# Patient Record
Sex: FEMALE | Race: ASIAN | NOT HISPANIC OR LATINO
[De-identification: names, ages, dates, MRNs, and addresses within clinical notes are randomized per-mention and may not be internally consistent; named-entity substitution may affect disease eponyms.]

---

## 2020-02-09 ENCOUNTER — TRANSCRIPTION ENCOUNTER (OUTPATIENT)
Age: 75
End: 2020-02-09

## 2022-12-05 PROBLEM — Z00.00 ENCOUNTER FOR PREVENTIVE HEALTH EXAMINATION: Status: ACTIVE | Noted: 2022-12-05

## 2022-12-16 ENCOUNTER — APPOINTMENT (OUTPATIENT)
Dept: ORTHOPEDIC SURGERY | Facility: CLINIC | Age: 77
End: 2022-12-16

## 2022-12-16 VITALS
HEIGHT: 59 IN | HEART RATE: 61 BPM | BODY MASS INDEX: 19.96 KG/M2 | WEIGHT: 99 LBS | SYSTOLIC BLOOD PRESSURE: 142 MMHG | DIASTOLIC BLOOD PRESSURE: 80 MMHG

## 2022-12-16 VITALS — TEMPERATURE: 98.1 F

## 2022-12-16 PROCEDURE — 99203 OFFICE O/P NEW LOW 30 MIN: CPT

## 2022-12-16 PROCEDURE — 72100 X-RAY EXAM L-S SPINE 2/3 VWS: CPT

## 2022-12-16 NOTE — PHYSICAL EXAM
[Stooped] : stooped [de-identified] : Examination of the lumbar spine reveals no midline tenderness palpation, step-offs, or skin lesions. Decreased range of motion with respect to flexion, extension, lateral bending, and rotation. No tenderness to palpation of the sciatic notch. No tenderness palpation of the bilateral greater trochanters. No pain with passive internal/external rotation of the hips. No instability of bilateral lower extremities.  Negative OLI. Negative straight leg raise bilaterally. No bowstring. Negative femoral stretch. 5 out of 5 iliopsoas, hip abductors, hips adductors, quadriceps, hamstrings, gastrocsoleus, tibialis anterior, extensor hallucis longus, peroneals. Grossly intact sensation to light touch bilateral lower extremities. 1+ patellar and Achilles reflexes. Downgoing Babinski. No clonus. Intact proprioception. Palpable pulses. No skin lesion and no edema on the right and left lower extremities. [de-identified] : AP lateral lumbar x-rays reveals L3-4 spondylolisthesis\par \par Review of her lumbar spine MRI from [facility] reveals severe stenosis at L3-4 with spondylolisthesis.

## 2022-12-16 NOTE — HISTORY OF PRESENT ILLNESS
[de-identified] : Ms. SARAN CAMARA  is a 77 year old female who presents with back and bilateral leg pain.  She has a history of stenosis.   Denies any LE radicular symptoms.  Normal bowel and bladder control.   Denies any recent fevers, chills, sweats, weight loss, or infection.  She has done PT without any relief.  Walking is very limited.  SItting helps relieve her symptoms.  She has a history of diabetes. \par \par The patients past medical history, past surgical history, medications, allergies, and social history were reviewed by me today with the patient and documented accordingly.  In addition, the patient's family history, which is noncontributory to their visit, was also reviewed.\par

## 2022-12-16 NOTE — DISCUSSION/SUMMARY
[de-identified] : We discussed further treatment options.  She has tried medications and therapy without relief.  She has significant neurogenic claudication and left greater than right radiculopathy.  She can only walk for 5 minutes before needing to sit down.  Overall she feels she is deteriorating.  We discussed the nature purpose of an L3-4 decompression with instrumented fusion.  Risks of surgery were discussed including but not limited to bleeding, infection,  hardware related complications, graft migration, pseudoarthrosis, damage to nerves and vessels, continued or worsening pain and weakness, dural injury, CSF leak, need for further procedures, PE/DVT, GI, , pulmonary, and cardiac complications,  anesthetic risks, and death. The patient understands the risks and benefits and alternatives. They would like to proceed with surgery. They will be scheduled accordingly. They will follow up for a preoperative appointment.

## 2022-12-19 ENCOUNTER — OUTPATIENT (OUTPATIENT)
Dept: OUTPATIENT SERVICES | Facility: HOSPITAL | Age: 77
LOS: 1 days | End: 2022-12-19

## 2022-12-19 VITALS
RESPIRATION RATE: 16 BRPM | HEIGHT: 58 IN | OXYGEN SATURATION: 98 % | WEIGHT: 100.97 LBS | DIASTOLIC BLOOD PRESSURE: 72 MMHG | SYSTOLIC BLOOD PRESSURE: 137 MMHG | TEMPERATURE: 98 F | HEART RATE: 60 BPM

## 2022-12-19 DIAGNOSIS — I10 ESSENTIAL (PRIMARY) HYPERTENSION: ICD-10-CM

## 2022-12-19 DIAGNOSIS — M48.061 SPINAL STENOSIS, LUMBAR REGION WITHOUT NEUROGENIC CLAUDICATION: ICD-10-CM

## 2022-12-19 DIAGNOSIS — H26.9 UNSPECIFIED CATARACT: Chronic | ICD-10-CM

## 2022-12-19 DIAGNOSIS — M54.16 RADICULOPATHY, LUMBAR REGION: ICD-10-CM

## 2022-12-19 LAB
A1C WITH ESTIMATED AVERAGE GLUCOSE RESULT: 8.2 % — HIGH (ref 4–5.6)
ALBUMIN SERPL ELPH-MCNC: 4 G/DL — SIGNIFICANT CHANGE UP (ref 3.3–5)
ALP SERPL-CCNC: 61 U/L — SIGNIFICANT CHANGE UP (ref 40–120)
ALT FLD-CCNC: 5 U/L — SIGNIFICANT CHANGE UP (ref 4–33)
ANION GAP SERPL CALC-SCNC: 10 MMOL/L — SIGNIFICANT CHANGE UP (ref 7–14)
AST SERPL-CCNC: 16 U/L — SIGNIFICANT CHANGE UP (ref 4–32)
BILIRUB SERPL-MCNC: 0.5 MG/DL — SIGNIFICANT CHANGE UP (ref 0.2–1.2)
BLD GP AB SCN SERPL QL: NEGATIVE — SIGNIFICANT CHANGE UP
BUN SERPL-MCNC: 21 MG/DL — SIGNIFICANT CHANGE UP (ref 7–23)
CALCIUM SERPL-MCNC: 9.5 MG/DL — SIGNIFICANT CHANGE UP (ref 8.4–10.5)
CHLORIDE SERPL-SCNC: 101 MMOL/L — SIGNIFICANT CHANGE UP (ref 98–107)
CO2 SERPL-SCNC: 23 MMOL/L — SIGNIFICANT CHANGE UP (ref 22–31)
CREAT SERPL-MCNC: 0.97 MG/DL — SIGNIFICANT CHANGE UP (ref 0.5–1.3)
EGFR: 61 ML/MIN/1.73M2 — SIGNIFICANT CHANGE UP
ESTIMATED AVERAGE GLUCOSE: 189 — SIGNIFICANT CHANGE UP
GLUCOSE SERPL-MCNC: 188 MG/DL — HIGH (ref 70–99)
HCT VFR BLD CALC: 29.2 % — LOW (ref 34.5–45)
HGB BLD-MCNC: 9.6 G/DL — LOW (ref 11.5–15.5)
MCHC RBC-ENTMCNC: 23.1 PG — LOW (ref 27–34)
MCHC RBC-ENTMCNC: 32.9 GM/DL — SIGNIFICANT CHANGE UP (ref 32–36)
MCV RBC AUTO: 70.2 FL — LOW (ref 80–100)
NRBC # BLD: 0 /100 WBCS — SIGNIFICANT CHANGE UP (ref 0–0)
NRBC # FLD: 0 K/UL — SIGNIFICANT CHANGE UP (ref 0–0)
PLATELET # BLD AUTO: 330 K/UL — SIGNIFICANT CHANGE UP (ref 150–400)
POTASSIUM SERPL-MCNC: 4.5 MMOL/L — SIGNIFICANT CHANGE UP (ref 3.5–5.3)
POTASSIUM SERPL-SCNC: 4.5 MMOL/L — SIGNIFICANT CHANGE UP (ref 3.5–5.3)
PROT SERPL-MCNC: 6.8 G/DL — SIGNIFICANT CHANGE UP (ref 6–8.3)
RBC # BLD: 4.16 M/UL — SIGNIFICANT CHANGE UP (ref 3.8–5.2)
RBC # FLD: 13.8 % — SIGNIFICANT CHANGE UP (ref 10.3–14.5)
RH IG SCN BLD-IMP: POSITIVE — SIGNIFICANT CHANGE UP
SODIUM SERPL-SCNC: 134 MMOL/L — LOW (ref 135–145)
WBC # BLD: 5.53 K/UL — SIGNIFICANT CHANGE UP (ref 3.8–10.5)
WBC # FLD AUTO: 5.53 K/UL — SIGNIFICANT CHANGE UP (ref 3.8–10.5)

## 2022-12-19 PROCEDURE — 93010 ELECTROCARDIOGRAM REPORT: CPT

## 2022-12-19 NOTE — H&P PST ADULT - PROBLEM SELECTOR PLAN 1
This is a 78 y/o female who is scheduled for L3-4 lumbar laminectomy and fusion on 12-27-22  * Ordered covid 19 and patient to have covid testing 3-5 days before surgery  * Given preop and cleanser instructions with good teach back and patient verbalized understanding

## 2022-12-19 NOTE — H&P PST ADULT - HISTORY OF PRESENT ILLNESS
This is a 76 y/o female who presents with spinal stenosis and c/o pain.. Xrays and MRI confirm pathology. Scheduled for L3-4 laminectomy and fusion.

## 2022-12-19 NOTE — H&P PST ADULT - EKG AND INTERPRETATION
done at [facility]  T&S, cmp, cbc, coivd-19 pcr (ordered) at [facility] done at [facility]  T&S, cmp, cbc, MRSA nose culture, coivd-19 pcr (ordered) at [facility]

## 2022-12-19 NOTE — H&P PST ADULT - ATTENDING COMMENTS
The nature of the planned surgery, other options available to the patient, the risks and possible complications of this surgery and the other options, including but not limited to death, paralysis, nerve damage, infection, wound healing complications bleeding, failure of the surgery to relieve  symptoms, failure of fusion, dislodgement of interbody graft, hardware failure/breakage/loosening, use of allograft, bone morphogenic protein, bone graft substitutes and their inherent risks and potential off-label use, pulmonary and/or cardiac complications, DVT, PE, stroke, brain injury, blindness, UTI, spinal fluid leakage, possible need for further surgery in the future, adjacent segment deterioration, etc were discussed with the patient at length and the patient understands and wishes to proceed.

## 2022-12-19 NOTE — H&P PST ADULT - CARDIOVASCULAR COMMENTS
can climb stairs without SOB-- 4 METS METS 4 -- can climb stairs without SOB -- Mallampati I with phonation

## 2022-12-19 NOTE — H&P PST ADULT - PROBLEM SELECTOR PLAN 2
Await prearranged medical evaluation with pcp due to major surgery and advanced age  * Await stress test done 2-3 years ago through pcp  * Instructed to take normal am dose of carvedilol, losartan, amlodipine, and ezetimibe the am of surgery  * Instructed not to take normal am dose of metformin the am of surgery

## 2022-12-19 NOTE — H&P PST ADULT - NSICDXPASTMEDICALHX_GEN_ALL_CORE_FT
PAST MEDICAL HISTORY:  Hypercholesterolemia     Hypertension     Lumbar radiculopathy     Lumbar spinal stenosis     Spondylolisthesis at L3-L4 level     Type 2 diabetes mellitus

## 2022-12-20 LAB
MRSA PCR RESULT.: SIGNIFICANT CHANGE UP
S AUREUS DNA NOSE QL NAA+PROBE: SIGNIFICANT CHANGE UP

## 2022-12-26 ENCOUNTER — TRANSCRIPTION ENCOUNTER (OUTPATIENT)
Age: 77
End: 2022-12-26

## 2022-12-27 ENCOUNTER — APPOINTMENT (OUTPATIENT)
Dept: ORTHOPEDIC SURGERY | Facility: HOSPITAL | Age: 77
End: 2022-12-27

## 2022-12-28 ENCOUNTER — NON-APPOINTMENT (OUTPATIENT)
Age: 77
End: 2022-12-28

## 2022-12-28 LAB — SARS-COV-2 N GENE NPH QL NAA+PROBE: NOT DETECTED

## 2023-01-05 ENCOUNTER — NON-APPOINTMENT (OUTPATIENT)
Age: 78
End: 2023-01-05

## 2023-01-05 PROBLEM — M43.16 SPONDYLOLISTHESIS, LUMBAR REGION: Chronic | Status: ACTIVE | Noted: 2022-12-19

## 2023-01-05 PROBLEM — M54.16 RADICULOPATHY, LUMBAR REGION: Chronic | Status: ACTIVE | Noted: 2022-12-19

## 2023-01-05 PROBLEM — E78.00 PURE HYPERCHOLESTEROLEMIA, UNSPECIFIED: Chronic | Status: ACTIVE | Noted: 2022-12-19

## 2023-01-05 PROBLEM — M48.061 SPINAL STENOSIS, LUMBAR REGION WITHOUT NEUROGENIC CLAUDICATION: Chronic | Status: ACTIVE | Noted: 2022-12-19

## 2023-01-05 PROBLEM — E11.9 TYPE 2 DIABETES MELLITUS WITHOUT COMPLICATIONS: Chronic | Status: ACTIVE | Noted: 2022-12-19

## 2023-01-05 PROBLEM — I10 ESSENTIAL (PRIMARY) HYPERTENSION: Chronic | Status: ACTIVE | Noted: 2022-12-19

## 2023-01-06 ENCOUNTER — APPOINTMENT (OUTPATIENT)
Dept: ORTHOPEDIC SURGERY | Facility: CLINIC | Age: 78
End: 2023-01-06

## 2023-01-09 ENCOUNTER — OUTPATIENT (OUTPATIENT)
Dept: OUTPATIENT SERVICES | Facility: HOSPITAL | Age: 78
LOS: 1 days | End: 2023-01-09

## 2023-01-09 DIAGNOSIS — M54.16 RADICULOPATHY, LUMBAR REGION: ICD-10-CM

## 2023-01-09 DIAGNOSIS — H26.9 UNSPECIFIED CATARACT: Chronic | ICD-10-CM

## 2023-01-09 LAB
BLD GP AB SCN SERPL QL: NEGATIVE — SIGNIFICANT CHANGE UP
RH IG SCN BLD-IMP: POSITIVE — SIGNIFICANT CHANGE UP

## 2023-01-11 LAB — SARS-COV-2 N GENE NPH QL NAA+PROBE: NOT DETECTED

## 2023-01-13 ENCOUNTER — TRANSCRIPTION ENCOUNTER (OUTPATIENT)
Age: 78
End: 2023-01-13

## 2023-01-13 NOTE — ASU PATIENT PROFILE, ADULT - HAVE YOU RECEIVED AT LEAST TWO PFIZER AND/OR MODERNA VACCINATIONS (IN ANY COMBINATION) AND/OR ONE JOHNSON & JOHNSON VACCINATION?
Protocol For Photochemotherapy: Tar And Nbuvb (Goeckerman Treatment): The patient received Photochemotherapy: Tar and NBUVB (Goeckerman treatment). Protocol For Photochemotherapy: Tar And Broad Band Uvb (Goeckerman Treatment): The patient received Photochemotherapy: Tar and Broad Band UVB (Goeckerman treatment). Protocol For Nbuvb: The patient received NBUVB. Yes Protocol For Puva: The patient received PUVA. Post-Care Instructions: I reviewed with the patient in detail post-care instructions. Patient is to wear sun protection. Patients may expect sunburn like redness, discomfort and scabbing. Protocol For Photochemotherapy: Triamcinolone Ointment And Nbuvb: The patient received Photochemotherapy: Triamcinolone and NBUVB (triamcinolone ointment applied to all lesions prior to phototherapy). Protocol For Nb Uva: The patient received NB UVA. Protocol For Uva1: The patient received UVA1. Total Body Energy: 1247 Detail Level: Generalized Protocol For Photochemotherapy For Severe Photoresponsive Dermatoses: Petrolatum And Broad Band Uvb: The patient received Photochemotherapyfor severe photoresponsive dermatoses: Petrolatum and Broad Band UVB requiring at least 4 to 8 hours of care under direct physician supervision. Protocol For Photochemotherapy: Petrolatum And Nbuvb: The patient received Photochemotherapy: Petrolatum and NBUVB (petrolatum applied to all lesions prior to phototherapy). Protocol For Photochemotherapy: Petrolatum And Broad Band Uvb: The patient received Photochemotherapy: Petrolatum and Broad Band UVB. Protocol For Uva: The patient received UVA. Comments On Previous Treatment: Patient reports no issues with last treatment. Advised of PPE and sunscreen. Maintain dose continued. Render Post-Care In The Note: no Total Treatment Time: 20:33 Consent: Written consent obtained.  The risks were reviewed with the patient including but not limited to: burn, pigmentary changes, pain, blistering, scabbing, redness, increased risk of skin cancers, and the remote possibility of scarring. Protocol For Photochemotherapy For Severe Photoresponsive Dermatoses: Petrolatum And Nbuvb: The patient received Photochemotherapy for severe photoresponsive dermatoses: Petrolatum and NBUVB requiring at least 4 to 8 hours of care under direct physician supervision. Protocol For Bath Puva: The patient received Bath PUVA. Treatment Number: 28 Skin Type: IV Protocol: NBUVB Protocol For Photochemotherapy For Severe Photoresponsive Dermatoses: Tar And Broad Band Uvb (Goeckerman Treatment): The patient received Photochemotherapy for severe photoresponsive dermatoses: Tar and Broad Band UVB (Goeckerman treatment) requiring at least 4 to 8 hours of care under direct physician supervision. Total Body Time: 28:52 Name Of Supervising Technician: Scottie Protocol For Photochemotherapy For Severe Photoresponsive Dermatoses: Puva: The patient received Photochemotherapy for severe photoresponsive dermatoses: PUVA requiring at least 4 to 8 hours of care under direct physician supervision. Protocol For Photochemotherapy: Mineral Oil And Nbuvb: The patient received Photochemotherapy: Mineral Oil and NBUVB (mineral oil applied to all lesions prior to phototherapy). Protocol For Photochemotherapy: Baby Oil And Nbuvb: The patient received Photochemotherapy: Baby Oil and NBUVB (baby oil applied to all lesions prior to phototherapy). Treatment Number: 86 Protocol For Photochemotherapy For Severe Photoresponsive Dermatoses: Tar And Nbuvb (Goeckerman Treatment): The patient received Photochemotherapy for severe photoresponsive dermatoses: Tar and NBUVB (Goeckerman treatment) requiring at least 4 to 8 hours of care under direct physician supervision. Protocol For Broad Band Uvb: The patient received Broad Band UVB. Protocol For Photochemotherapy: Mineral Oil And Broad Band Uvb: The patient received Photochemotherapy: Mineral Oil and Broad Band UVB. Protocol For Protocol For Photochemotherapy For Severe Photoresponsive Dermatoses: Bath Puva: The patient received Photochemotherapy for severe photoresponsive dermatoses: Bath PUVA requiring at least 4 to 8 hours of care under direct physician supervision. Changes In Treatment Protocol: Continue on maintenance dose, 3170mJ.

## 2023-01-13 NOTE — ASU PATIENT PROFILE, ADULT - FALL HARM RISK - UNIVERSAL INTERVENTIONS
Bed in lowest position, wheels locked, appropriate side rails in place/Call bell, personal items and telephone in reach/Instruct patient to call for assistance before getting out of bed or chair/Non-slip footwear when patient is out of bed/[place] to call system/Physically safe environment - no spills, clutter or unnecessary equipment/Purposeful Proactive Rounding/Room/bathroom lighting operational, light cord in reach

## 2023-01-14 ENCOUNTER — RESULT REVIEW (OUTPATIENT)
Age: 78
End: 2023-01-14

## 2023-01-14 ENCOUNTER — INPATIENT (INPATIENT)
Facility: HOSPITAL | Age: 78
LOS: 2 days | Discharge: HOME | End: 2023-01-17
Attending: STUDENT IN AN ORGANIZED HEALTH CARE EDUCATION/TRAINING PROGRAM | Admitting: STUDENT IN AN ORGANIZED HEALTH CARE EDUCATION/TRAINING PROGRAM
Payer: MEDICARE

## 2023-01-14 ENCOUNTER — APPOINTMENT (OUTPATIENT)
Dept: ORTHOPEDIC SURGERY | Facility: HOSPITAL | Age: 78
End: 2023-01-14

## 2023-01-14 VITALS
SYSTOLIC BLOOD PRESSURE: 151 MMHG | WEIGHT: 100.97 LBS | RESPIRATION RATE: 16 BRPM | TEMPERATURE: 99 F | HEART RATE: 71 BPM | DIASTOLIC BLOOD PRESSURE: 73 MMHG | OXYGEN SATURATION: 100 % | HEIGHT: 58 IN

## 2023-01-14 DIAGNOSIS — M54.16 RADICULOPATHY, LUMBAR REGION: ICD-10-CM

## 2023-01-14 DIAGNOSIS — H26.9 UNSPECIFIED CATARACT: Chronic | ICD-10-CM

## 2023-01-14 LAB
ANION GAP SERPL CALC-SCNC: 11 MMOL/L — SIGNIFICANT CHANGE UP (ref 7–14)
BUN SERPL-MCNC: 14 MG/DL — SIGNIFICANT CHANGE UP (ref 7–23)
CALCIUM SERPL-MCNC: 8.7 MG/DL — SIGNIFICANT CHANGE UP (ref 8.4–10.5)
CHLORIDE SERPL-SCNC: 103 MMOL/L — SIGNIFICANT CHANGE UP (ref 98–107)
CO2 SERPL-SCNC: 23 MMOL/L — SIGNIFICANT CHANGE UP (ref 22–31)
CREAT SERPL-MCNC: 0.93 MG/DL — SIGNIFICANT CHANGE UP (ref 0.5–1.3)
EGFR: 64 ML/MIN/1.73M2 — SIGNIFICANT CHANGE UP
GLUCOSE BLDC GLUCOMTR-MCNC: 166 MG/DL — HIGH (ref 70–99)
GLUCOSE BLDC GLUCOMTR-MCNC: 188 MG/DL — HIGH (ref 70–99)
GLUCOSE BLDC GLUCOMTR-MCNC: 411 MG/DL — HIGH (ref 70–99)
GLUCOSE BLDC GLUCOMTR-MCNC: 428 MG/DL — HIGH (ref 70–99)
GLUCOSE SERPL-MCNC: 187 MG/DL — HIGH (ref 70–99)
HCT VFR BLD CALC: 27.9 % — LOW (ref 34.5–45)
HGB BLD-MCNC: 9.1 G/DL — LOW (ref 11.5–15.5)
MCHC RBC-ENTMCNC: 22.8 PG — LOW (ref 27–34)
MCHC RBC-ENTMCNC: 32.6 GM/DL — SIGNIFICANT CHANGE UP (ref 32–36)
MCV RBC AUTO: 69.9 FL — LOW (ref 80–100)
NRBC # BLD: 0 /100 WBCS — SIGNIFICANT CHANGE UP (ref 0–0)
NRBC # FLD: 0 K/UL — SIGNIFICANT CHANGE UP (ref 0–0)
PLATELET # BLD AUTO: 297 K/UL — SIGNIFICANT CHANGE UP (ref 150–400)
POTASSIUM SERPL-MCNC: 4.6 MMOL/L — SIGNIFICANT CHANGE UP (ref 3.5–5.3)
POTASSIUM SERPL-SCNC: 4.6 MMOL/L — SIGNIFICANT CHANGE UP (ref 3.5–5.3)
RBC # BLD: 3.99 M/UL — SIGNIFICANT CHANGE UP (ref 3.8–5.2)
RBC # FLD: 13.5 % — SIGNIFICANT CHANGE UP (ref 10.3–14.5)
SODIUM SERPL-SCNC: 137 MMOL/L — SIGNIFICANT CHANGE UP (ref 135–145)
WBC # BLD: 10.59 K/UL — HIGH (ref 3.8–10.5)
WBC # FLD AUTO: 10.59 K/UL — HIGH (ref 3.8–10.5)

## 2023-01-14 PROCEDURE — 22840 INSERT SPINE FIXATION DEVICE: CPT

## 2023-01-14 PROCEDURE — 88311 DECALCIFY TISSUE: CPT | Mod: 26

## 2023-01-14 PROCEDURE — 88304 TISSUE EXAM BY PATHOLOGIST: CPT | Mod: 26

## 2023-01-14 PROCEDURE — 63267 EXCISE INTRSPINL LESION LMBR: CPT

## 2023-01-14 PROCEDURE — 63047 LAM FACETEC & FORAMOT LUMBAR: CPT | Mod: 59

## 2023-01-14 PROCEDURE — 22214 INCIS 1 VERTEBRAL SEG LUMBAR: CPT

## 2023-01-14 PROCEDURE — 22612 ARTHRD PST TQ 1NTRSPC LUMBAR: CPT

## 2023-01-14 DEVICE — SCREW SERRATO 6.5X45MM: Type: IMPLANTABLE DEVICE | Status: FUNCTIONAL

## 2023-01-14 DEVICE — SURGIFOAM PAD 8CM X 12.5CM X 2MM (100C): Type: IMPLANTABLE DEVICE | Status: FUNCTIONAL

## 2023-01-14 DEVICE — SURGIFLO MATRIX WITH THROMBIN KIT: Type: IMPLANTABLE DEVICE | Status: FUNCTIONAL

## 2023-01-14 DEVICE — IMPLANTABLE DEVICE: Type: IMPLANTABLE DEVICE | Status: FUNCTIONAL

## 2023-01-14 DEVICE — SCREW BLOCKER BONE XIA: Type: IMPLANTABLE DEVICE | Status: FUNCTIONAL

## 2023-01-14 DEVICE — SCREW SERRATO POLY 6.5X40MM: Type: IMPLANTABLE DEVICE | Status: FUNCTIONAL

## 2023-01-14 DEVICE — BONE WAX 2.5GM: Type: IMPLANTABLE DEVICE | Status: FUNCTIONAL

## 2023-01-14 DEVICE — FIBREGRAFT BG MATRIX 6.25CC: Type: IMPLANTABLE DEVICE | Status: FUNCTIONAL

## 2023-01-14 RX ORDER — CYCLOBENZAPRINE HYDROCHLORIDE 10 MG/1
5 TABLET, FILM COATED ORAL THREE TIMES A DAY
Refills: 0 | Status: DISCONTINUED | OUTPATIENT
Start: 2023-01-14 | End: 2023-01-17

## 2023-01-14 RX ORDER — ONDANSETRON 8 MG/1
4 TABLET, FILM COATED ORAL EVERY 6 HOURS
Refills: 0 | Status: DISCONTINUED | OUTPATIENT
Start: 2023-01-14 | End: 2023-01-17

## 2023-01-14 RX ORDER — CEFAZOLIN SODIUM 1 G
2000 VIAL (EA) INJECTION EVERY 8 HOURS
Refills: 0 | Status: COMPLETED | OUTPATIENT
Start: 2023-01-14 | End: 2023-01-15

## 2023-01-14 RX ORDER — SODIUM CHLORIDE 9 MG/ML
500 INJECTION, SOLUTION INTRAVENOUS ONCE
Refills: 0 | Status: COMPLETED | OUTPATIENT
Start: 2023-01-15 | End: 2023-01-15

## 2023-01-14 RX ORDER — SENNA PLUS 8.6 MG/1
2 TABLET ORAL AT BEDTIME
Refills: 0 | Status: DISCONTINUED | OUTPATIENT
Start: 2023-01-14 | End: 2023-01-17

## 2023-01-14 RX ORDER — INSULIN LISPRO 100/ML
VIAL (ML) SUBCUTANEOUS AT BEDTIME
Refills: 0 | Status: DISCONTINUED | OUTPATIENT
Start: 2023-01-14 | End: 2023-01-17

## 2023-01-14 RX ORDER — POLYETHYLENE GLYCOL 3350 17 G/17G
17 POWDER, FOR SOLUTION ORAL
Refills: 0 | Status: DISCONTINUED | OUTPATIENT
Start: 2023-01-14 | End: 2023-01-17

## 2023-01-14 RX ORDER — OXYCODONE HYDROCHLORIDE 5 MG/1
10 TABLET ORAL EVERY 4 HOURS
Refills: 0 | Status: DISCONTINUED | OUTPATIENT
Start: 2023-01-14 | End: 2023-01-17

## 2023-01-14 RX ORDER — INSULIN LISPRO 100/ML
VIAL (ML) SUBCUTANEOUS
Refills: 0 | Status: DISCONTINUED | OUTPATIENT
Start: 2023-01-14 | End: 2023-01-17

## 2023-01-14 RX ORDER — METFORMIN HYDROCHLORIDE 850 MG/1
1 TABLET ORAL
Qty: 0 | Refills: 0 | DISCHARGE

## 2023-01-14 RX ORDER — EZETIMIBE 10 MG/1
1 TABLET ORAL
Qty: 0 | Refills: 0 | DISCHARGE

## 2023-01-14 RX ORDER — AMLODIPINE BESYLATE 2.5 MG/1
1 TABLET ORAL
Qty: 0 | Refills: 0 | DISCHARGE

## 2023-01-14 RX ORDER — GABAPENTIN 400 MG/1
100 CAPSULE ORAL THREE TIMES A DAY
Refills: 0 | Status: DISCONTINUED | OUTPATIENT
Start: 2023-01-15 | End: 2023-01-17

## 2023-01-14 RX ORDER — OXYCODONE HYDROCHLORIDE 5 MG/1
5 TABLET ORAL EVERY 4 HOURS
Refills: 0 | Status: DISCONTINUED | OUTPATIENT
Start: 2023-01-14 | End: 2023-01-17

## 2023-01-14 RX ORDER — HYDROMORPHONE HYDROCHLORIDE 2 MG/ML
1 INJECTION INTRAMUSCULAR; INTRAVENOUS; SUBCUTANEOUS
Refills: 0 | Status: DISCONTINUED | OUTPATIENT
Start: 2023-01-14 | End: 2023-01-14

## 2023-01-14 RX ORDER — CARVEDILOL PHOSPHATE 80 MG/1
12.5 CAPSULE, EXTENDED RELEASE ORAL DAILY
Refills: 0 | Status: DISCONTINUED | OUTPATIENT
Start: 2023-01-14 | End: 2023-01-17

## 2023-01-14 RX ORDER — SODIUM CHLORIDE 9 MG/ML
1000 INJECTION, SOLUTION INTRAVENOUS
Refills: 0 | Status: DISCONTINUED | OUTPATIENT
Start: 2023-01-14 | End: 2023-01-17

## 2023-01-14 RX ORDER — PANTOPRAZOLE SODIUM 20 MG/1
40 TABLET, DELAYED RELEASE ORAL
Refills: 0 | Status: DISCONTINUED | OUTPATIENT
Start: 2023-01-15 | End: 2023-01-17

## 2023-01-14 RX ORDER — LANOLIN ALCOHOL/MO/W.PET/CERES
3 CREAM (GRAM) TOPICAL AT BEDTIME
Refills: 0 | Status: DISCONTINUED | OUTPATIENT
Start: 2023-01-14 | End: 2023-01-17

## 2023-01-14 RX ORDER — LOSARTAN POTASSIUM 100 MG/1
100 TABLET, FILM COATED ORAL DAILY
Refills: 0 | Status: DISCONTINUED | OUTPATIENT
Start: 2023-01-14 | End: 2023-01-17

## 2023-01-14 RX ORDER — AMLODIPINE BESYLATE 2.5 MG/1
10 TABLET ORAL DAILY
Refills: 0 | Status: DISCONTINUED | OUTPATIENT
Start: 2023-01-14 | End: 2023-01-17

## 2023-01-14 RX ORDER — SODIUM CHLORIDE 9 MG/ML
500 INJECTION, SOLUTION INTRAVENOUS ONCE
Refills: 0 | Status: COMPLETED | OUTPATIENT
Start: 2023-01-14 | End: 2023-01-14

## 2023-01-14 RX ORDER — CARVEDILOL PHOSPHATE 80 MG/1
1 CAPSULE, EXTENDED RELEASE ORAL
Qty: 0 | Refills: 0 | DISCHARGE

## 2023-01-14 RX ORDER — ACETAMINOPHEN 500 MG
975 TABLET ORAL EVERY 8 HOURS
Refills: 0 | Status: COMPLETED | OUTPATIENT
Start: 2023-01-14 | End: 2023-01-17

## 2023-01-14 RX ORDER — TRAMADOL HYDROCHLORIDE 50 MG/1
25 TABLET ORAL EVERY 8 HOURS
Refills: 0 | Status: DISCONTINUED | OUTPATIENT
Start: 2023-01-14 | End: 2023-01-17

## 2023-01-14 RX ORDER — HYDROMORPHONE HYDROCHLORIDE 2 MG/ML
0.5 INJECTION INTRAMUSCULAR; INTRAVENOUS; SUBCUTANEOUS
Refills: 0 | Status: DISCONTINUED | OUTPATIENT
Start: 2023-01-14 | End: 2023-01-14

## 2023-01-14 RX ADMIN — HYDROMORPHONE HYDROCHLORIDE 0.5 MILLIGRAM(S): 2 INJECTION INTRAMUSCULAR; INTRAVENOUS; SUBCUTANEOUS at 15:29

## 2023-01-14 RX ADMIN — Medication 100 MILLIGRAM(S): at 17:18

## 2023-01-14 RX ADMIN — HYDROMORPHONE HYDROCHLORIDE 0.5 MILLIGRAM(S): 2 INJECTION INTRAMUSCULAR; INTRAVENOUS; SUBCUTANEOUS at 13:02

## 2023-01-14 RX ADMIN — SODIUM CHLORIDE 500 MILLILITER(S): 9 INJECTION, SOLUTION INTRAVENOUS at 15:03

## 2023-01-14 RX ADMIN — SENNA PLUS 2 TABLET(S): 8.6 TABLET ORAL at 21:03

## 2023-01-14 RX ADMIN — Medication 4: at 21:49

## 2023-01-14 RX ADMIN — SODIUM CHLORIDE 75 MILLILITER(S): 9 INJECTION, SOLUTION INTRAVENOUS at 17:18

## 2023-01-14 RX ADMIN — Medication 1: at 15:34

## 2023-01-14 RX ADMIN — Medication 975 MILLIGRAM(S): at 21:03

## 2023-01-14 RX ADMIN — POLYETHYLENE GLYCOL 3350 17 GRAM(S): 17 POWDER, FOR SOLUTION ORAL at 17:17

## 2023-01-14 NOTE — CHART NOTE - NSCHARTNOTEFT_GEN_A_CORE
ORTHO PROGRESS NOTE      Pt resting comfortably without complaint. Pain controlled. Denies N/V, dizziness or SOB       Vital Signs Last 24 Hrs  T(C): 37 (14 Jan 2023 14:30), Max: 37.5 (14 Jan 2023 12:35)  T(F): 98.6 (14 Jan 2023 14:30), Max: 99.5 (14 Jan 2023 12:35)  HR: 56 (14 Jan 2023 14:30) (56 - 75)  BP: 122/48 (14 Jan 2023 14:30) (99/20 - 151/73)  BP(mean): 66 (14 Jan 2023 14:30) (26 - 91)  RR: 12 (14 Jan 2023 14:30) (7 - 16)  SpO2: 100% (14 Jan 2023 14:30) (94% - 100%)        Back: Dressing/ Incision Clean/Dry/Intact,  HV intact with SS output  Motor:                   C5                C6              C7               C8           T1   R            5/5                5/5            5/5             5/5          5/5  L             5/5               5/5             5/5             5/5          5/5                L2             L3             L4               L5            S1  R         5/5           5/5          5/5             5/5           5/5  L          5/5          5/5           5/5             5/5           5/5    Sensory:            C5         C6         C7      C8       T1        (0=absent, 1=impaired, 2=normal, NT=not testable)  R         2            2           2        2         2  L          2            2           2        2         2               L2          L3         L4      L5       S1         (0=absent, 1=impaired, 2=normal, NT=not testable)  R         2            2            2        2        2  L          2            2           2        2         2      A/P: 77yFemale s/p L3-S1 lami, L4-5 fusion           - WBAT           -PT/OOB           -DVT ppx- venodynes           - Pain control           -Incentive spirometer

## 2023-01-14 NOTE — BRIEF OPERATIVE NOTE - NSICDXBRIEFPROCEDURE_GEN_ALL_CORE_FT
PROCEDURES:  Fusion, spine, lumbar, posterior approach, with laminectomy 14-Jan-2023 13:15:25  Nikki Thomson  Excision, synovial cyst, spine, lumbar, 1 level 14-Jan-2023 13:16:16  Nikki Thomson

## 2023-01-14 NOTE — PATIENT PROFILE ADULT - FALL HARM RISK - RISK INTERVENTIONS
Assistance OOB with selected safe patient handling equipment/Assistance with ambulation/Communicate Fall Risk and Risk Factors to all staff, patient, and family/Discuss with provider need for PT consult/Monitor gait and stability/Provide patient with walking aids - walker, cane, crutches/Reinforce activity limits and safety measures with patient and family/Sit up slowly, dangle for a short time, stand at bedside before walking/Use of alarms - bed, chair and/or voice tab/Visual Cue: Yellow wristband/Bed in lowest position, wheels locked, appropriate side rails in place/Call bell, personal items and telephone in reach/Instruct patient to call for assistance before getting out of bed or chair/Non-slip footwear when patient is out of bed/[place] to call system/Physically safe environment - no spills, clutter or unnecessary equipment/Purposeful Proactive Rounding/Room/bathroom lighting operational, light cord in reach

## 2023-01-15 DIAGNOSIS — M48.061 SPINAL STENOSIS, LUMBAR REGION WITHOUT NEUROGENIC CLAUDICATION: ICD-10-CM

## 2023-01-15 DIAGNOSIS — I10 ESSENTIAL (PRIMARY) HYPERTENSION: ICD-10-CM

## 2023-01-15 DIAGNOSIS — Z29.9 ENCOUNTER FOR PROPHYLACTIC MEASURES, UNSPECIFIED: ICD-10-CM

## 2023-01-15 DIAGNOSIS — E11.9 TYPE 2 DIABETES MELLITUS WITHOUT COMPLICATIONS: ICD-10-CM

## 2023-01-15 DIAGNOSIS — E78.5 HYPERLIPIDEMIA, UNSPECIFIED: ICD-10-CM

## 2023-01-15 LAB
ANION GAP SERPL CALC-SCNC: 10 MMOL/L — SIGNIFICANT CHANGE UP (ref 7–14)
BUN SERPL-MCNC: 13 MG/DL — SIGNIFICANT CHANGE UP (ref 7–23)
CALCIUM SERPL-MCNC: 9.1 MG/DL — SIGNIFICANT CHANGE UP (ref 8.4–10.5)
CHLORIDE SERPL-SCNC: 100 MMOL/L — SIGNIFICANT CHANGE UP (ref 98–107)
CO2 SERPL-SCNC: 26 MMOL/L — SIGNIFICANT CHANGE UP (ref 22–31)
CREAT SERPL-MCNC: 1.04 MG/DL — SIGNIFICANT CHANGE UP (ref 0.5–1.3)
EGFR: 56 ML/MIN/1.73M2 — LOW
GLUCOSE BLDC GLUCOMTR-MCNC: 149 MG/DL — HIGH (ref 70–99)
GLUCOSE BLDC GLUCOMTR-MCNC: 185 MG/DL — HIGH (ref 70–99)
GLUCOSE BLDC GLUCOMTR-MCNC: 192 MG/DL — HIGH (ref 70–99)
GLUCOSE BLDC GLUCOMTR-MCNC: 322 MG/DL — HIGH (ref 70–99)
GLUCOSE BLDC GLUCOMTR-MCNC: 432 MG/DL — HIGH (ref 70–99)
GLUCOSE SERPL-MCNC: 158 MG/DL — HIGH (ref 70–99)
HCT VFR BLD CALC: 26.3 % — LOW (ref 34.5–45)
HGB BLD-MCNC: 8.7 G/DL — LOW (ref 11.5–15.5)
MCHC RBC-ENTMCNC: 22.8 PG — LOW (ref 27–34)
MCHC RBC-ENTMCNC: 33.1 GM/DL — SIGNIFICANT CHANGE UP (ref 32–36)
MCV RBC AUTO: 68.8 FL — LOW (ref 80–100)
NRBC # BLD: 0 /100 WBCS — SIGNIFICANT CHANGE UP (ref 0–0)
NRBC # FLD: 0 K/UL — SIGNIFICANT CHANGE UP (ref 0–0)
PLATELET # BLD AUTO: 297 K/UL — SIGNIFICANT CHANGE UP (ref 150–400)
POTASSIUM SERPL-MCNC: 4.2 MMOL/L — SIGNIFICANT CHANGE UP (ref 3.5–5.3)
POTASSIUM SERPL-SCNC: 4.2 MMOL/L — SIGNIFICANT CHANGE UP (ref 3.5–5.3)
RBC # BLD: 3.82 M/UL — SIGNIFICANT CHANGE UP (ref 3.8–5.2)
RBC # FLD: 13.9 % — SIGNIFICANT CHANGE UP (ref 10.3–14.5)
SODIUM SERPL-SCNC: 136 MMOL/L — SIGNIFICANT CHANGE UP (ref 135–145)
WBC # BLD: 9.33 K/UL — SIGNIFICANT CHANGE UP (ref 3.8–10.5)
WBC # FLD AUTO: 9.33 K/UL — SIGNIFICANT CHANGE UP (ref 3.8–10.5)

## 2023-01-15 PROCEDURE — 99223 1ST HOSP IP/OBS HIGH 75: CPT

## 2023-01-15 RX ADMIN — Medication 975 MILLIGRAM(S): at 21:43

## 2023-01-15 RX ADMIN — SENNA PLUS 2 TABLET(S): 8.6 TABLET ORAL at 21:44

## 2023-01-15 RX ADMIN — GABAPENTIN 100 MILLIGRAM(S): 400 CAPSULE ORAL at 05:56

## 2023-01-15 RX ADMIN — POLYETHYLENE GLYCOL 3350 17 GRAM(S): 17 POWDER, FOR SOLUTION ORAL at 05:56

## 2023-01-15 RX ADMIN — Medication 1: at 11:45

## 2023-01-15 RX ADMIN — POLYETHYLENE GLYCOL 3350 17 GRAM(S): 17 POWDER, FOR SOLUTION ORAL at 18:20

## 2023-01-15 RX ADMIN — CARVEDILOL PHOSPHATE 12.5 MILLIGRAM(S): 80 CAPSULE, EXTENDED RELEASE ORAL at 05:55

## 2023-01-15 RX ADMIN — Medication 975 MILLIGRAM(S): at 13:51

## 2023-01-15 RX ADMIN — Medication 975 MILLIGRAM(S): at 05:55

## 2023-01-15 RX ADMIN — AMLODIPINE BESYLATE 10 MILLIGRAM(S): 2.5 TABLET ORAL at 05:55

## 2023-01-15 RX ADMIN — PANTOPRAZOLE SODIUM 40 MILLIGRAM(S): 20 TABLET, DELAYED RELEASE ORAL at 05:55

## 2023-01-15 RX ADMIN — SODIUM CHLORIDE 500 MILLILITER(S): 9 INJECTION, SOLUTION INTRAVENOUS at 05:57

## 2023-01-15 RX ADMIN — GABAPENTIN 100 MILLIGRAM(S): 400 CAPSULE ORAL at 13:50

## 2023-01-15 RX ADMIN — Medication 100 MILLIGRAM(S): at 00:56

## 2023-01-15 RX ADMIN — GABAPENTIN 100 MILLIGRAM(S): 400 CAPSULE ORAL at 21:43

## 2023-01-15 RX ADMIN — Medication 6: at 16:54

## 2023-01-15 RX ADMIN — LOSARTAN POTASSIUM 100 MILLIGRAM(S): 100 TABLET, FILM COATED ORAL at 05:56

## 2023-01-15 RX ADMIN — Medication 1: at 07:32

## 2023-01-15 NOTE — CONSULT NOTE ADULT - ASSESSMENT
77W with PMH significant for but not limited to HTN, HLD, T2DM on metformin, and lumbar spinal stenosis who is presenting to the hospital after L3-S1 laminectomy with L4-L5 fusion on 1/14.

## 2023-01-15 NOTE — CONSULT NOTE ADULT - SUBJECTIVE AND OBJECTIVE BOX
CHIEF COMPLAINT: Patient is a 77y old  Female who presents with a chief complaint of     HPI:   77W with PMH significant for but not limited to HTN, HLD, T2DM on metformin, and lumbar spinal stenosis who is presenting to the hospital after L3-S1 laminectomy with L4-L5 fusion on 1/14. Patient states that she had been having significant neurogenic claudication on the left greater than right. She states that she was only able to walk for 5 minutes before pain caused her to sit down. She states that she tried numerous medications with no success.     Post op she sates that she is doing well. She had food brought in by family yesterday and states that afterwards her glose went to 400s. She normally takes metformin 500 mg with meals and typically only eats 2 meals a day at home. If she has a third meal then she will take an additional dose of metformin.       Pain Symptoms if applicable:             	                           none	    mild         moderate         severe  	                            0	     1-3	      4-6	          7-10  Pain:  Location:	  Modifying factors:	  Associated symptoms:	    Allergies    penicillin (Rash)  sulfa drugs (Hives)    Intolerances        HOME MEDICATIONS: [x] Reviewed    MEDICATIONS  (STANDING):  acetaminophen     Tablet .. 975 milliGRAM(s) Oral every 8 hours  amLODIPine   Tablet 10 milliGRAM(s) Oral daily  carvedilol 12.5 milliGRAM(s) Oral daily  gabapentin Solution 100 milliGRAM(s) Oral three times a day  insulin lispro (ADMELOG) corrective regimen sliding scale   SubCutaneous three times a day before meals  insulin lispro (ADMELOG) corrective regimen sliding scale   SubCutaneous at bedtime  lactated ringers. 1000 milliLiter(s) (75 mL/Hr) IV Continuous <Continuous>  losartan 100 milliGRAM(s) Oral daily  pantoprazole    Tablet 40 milliGRAM(s) Oral before breakfast  polyethylene glycol 3350 17 Gram(s) Oral two times a day  senna 2 Tablet(s) Oral at bedtime  traMADol 25 milliGRAM(s) Oral every 8 hours    MEDICATIONS  (PRN):  cyclobenzaprine 5 milliGRAM(s) Oral three times a day PRN Muscle Spasm  melatonin 3 milliGRAM(s) Oral at bedtime PRN Sleep  ondansetron Injectable 4 milliGRAM(s) IV Push every 6 hours PRN Nausea and/or Vomiting  oxyCODONE    IR 5 milliGRAM(s) Oral every 4 hours PRN Moderate Pain (4 - 6)  oxyCODONE    IR 10 milliGRAM(s) Oral every 4 hours PRN Severe Pain (7 - 10)      PAST MEDICAL & SURGICAL HISTORY:  Hypertension      Type 2 diabetes mellitus      Lumbar spinal stenosis      Spondylolisthesis at L3-L4 level      Lumbar radiculopathy      Hypercholesterolemia      Bilateral cataracts      [ ] Reviewed     SOCIAL HISTORY: lives with family  [x] Substance abuse:   [x] Tobacco:   [x] Alcohol use:     FAMILY HISTORY:  No pertinent family history in first degree relatives. Denies ECD    [x] No pertinent family history in first degree relatives     REVIEW OF SYSTEMS:    CONSTITUTIONAL: No fevers or chills  EYES/ENT: No vision changes or eye pain  NECK: No neck pain or stiffness.  RESPIRATORY: No shortness of breath. No cough, wheezing.  CARDIOVASCULAR: No chest pain or palpitations  GASTROINTESTINAL: No abdominal pain. No nausea, vomiting; No diarrhea or constipation.  GENITOURINARY: No dysuria or hematuria  NEUROLOGICAL: HPI  ENDO: No polyuria or polydipsia.   SKIN: No itching, rashes.    All other review of systems is negative unless indicated above.    Vital Signs Last 24 Hrs  T(C): 36.6 (15 Edin 2023 09:27), Max: 37 (14 Jan 2023 14:30)  T(F): 97.9 (15 Edin 2023 09:27), Max: 98.6 (14 Jan 2023 14:30)  HR: 65 (15 Edin 2023 09:27) (56 - 80)  BP: 117/51 (15 Edin 2023 09:27) (116/44 - 153/65)  BP(mean): 68 (14 Jan 2023 15:30) (63 - 68)  RR: 17 (15 Edin 2023 09:27) (12 - 18)  SpO2: 99% (15 Edin 2023 09:27) (96% - 100%)    Parameters below as of 15 Edin 2023 09:27  Patient On (Oxygen Delivery Method): room air        PHYSICAL EXAM:    GENERAL: elderly woman sitting up in bed appears well in NAD, well-groomed, well-developed  HEAD:  Atraumatic, Normocephalic  EYES: EOMI, PERRLA, conjunctiva and sclera clear  ENMT: Moist mucous membranes  NECK: Supple, No JVD  RESPIRATORY: Clear to auscultation bilaterally; No rales, rhonchi, wheezing, or rubs  CARDIOVASCULAR: Regular rate and rhythm; No murmurs, rubs, or gallops  GASTROINTESTINAL: Soft, Nontender, Nondistended; Bowel sounds present  EXTREMITIES:  2+ Peripheral Pulses, No clubbing, cyanosis, or edema  NERVOUS SYSTEM:  Alert & Oriented X3; Moving all 4 extremities; No gross sensory deficits in BLEs  SKIN: No rashes or lesions; Incisions C/D/I    LABS:                        8.7    9.33  )-----------( 297      ( 15 Edin 2023 05:18 )             26.3     01-15    136  |  100  |  13  ----------------------------<  158<H>  4.2   |  26  |  1.04    Ca    9.1      15 Edin 2023 05:18          CAPILLARY BLOOD GLUCOSE      POCT Blood Glucose.: 192 mg/dL (15 Edin 2023 11:13)      RADIOLOGY & ADDITIONAL STUDIES:    EKG:   Personally Reviewed:  [x] YES     Imaging:   Personally Reviewed:  [x] YES               [ ] Consultant(s) Notes Reviewed  [x] Care Discussed with Consultants/Other Providers: Ortho Resident

## 2023-01-15 NOTE — PHYSICAL THERAPY INITIAL EVALUATION ADULT - PERTINENT HX OF CURRENT PROBLEM, REHAB EVAL
Pt is a 77 year old female who presents for planned L3-S1 laminectomy; L4-L5 fusion performed 1/14/22.

## 2023-01-15 NOTE — PHYSICAL THERAPY INITIAL EVALUATION ADULT - ADDITIONAL COMMENTS
Pt lives in a private house with her  and daughter, +10-12 steps to enter, +1 flight inside to negotiate. Pt was independent with all functional mobility and ADL performance prior to admission without use of an assistive device.     Pt left seated in chair at bedside, all lines intact, all needs in reach, comfortable and in NAD. ANGELITA pittman.

## 2023-01-15 NOTE — CONSULT NOTE ADULT - PROBLEM SELECTOR RECOMMENDATION 2
A1c is 9.2 on December 19th, will recheck in A1C in AM   - likely would benefit from secondary agent on discharge, target A1C should be <7.5   - SGLT2i may be good choice given concurrent hypertension; please send canagliflozin, dapagliflozin, and empagliflozin to [facility] for price verification   - nutrition consult (order placed)  - c/w low dose ISS for now, CC diet

## 2023-01-15 NOTE — PHYSICAL THERAPY INITIAL EVALUATION ADULT - GENERAL OBSERVATIONS, REHAB EVAL
Pt received semi-supine in bed, +IV, +Campoverde, +Hemovac, dressing C/D/I, all lines intact, in NAD. Pt agreeable to participate in PT evaluation.

## 2023-01-15 NOTE — CONSULT NOTE ADULT - PROBLEM SELECTOR RECOMMENDATION 4
- on ezetimibe as OP, hold while inpatient   - check non-urgent lipid panel, can be done as outpatient

## 2023-01-16 LAB
A1C WITH ESTIMATED AVERAGE GLUCOSE RESULT: 7.6 % — HIGH (ref 4–5.6)
ANION GAP SERPL CALC-SCNC: 8 MMOL/L — SIGNIFICANT CHANGE UP (ref 7–14)
BUN SERPL-MCNC: 10 MG/DL — SIGNIFICANT CHANGE UP (ref 7–23)
CALCIUM SERPL-MCNC: 9.1 MG/DL — SIGNIFICANT CHANGE UP (ref 8.4–10.5)
CHLORIDE SERPL-SCNC: 99 MMOL/L — SIGNIFICANT CHANGE UP (ref 98–107)
CO2 SERPL-SCNC: 27 MMOL/L — SIGNIFICANT CHANGE UP (ref 22–31)
CREAT SERPL-MCNC: 0.93 MG/DL — SIGNIFICANT CHANGE UP (ref 0.5–1.3)
EGFR: 64 ML/MIN/1.73M2 — SIGNIFICANT CHANGE UP
ESTIMATED AVERAGE GLUCOSE: 171 — SIGNIFICANT CHANGE UP
GLUCOSE BLDC GLUCOMTR-MCNC: 122 MG/DL — HIGH (ref 70–99)
GLUCOSE BLDC GLUCOMTR-MCNC: 156 MG/DL — HIGH (ref 70–99)
GLUCOSE BLDC GLUCOMTR-MCNC: 186 MG/DL — HIGH (ref 70–99)
GLUCOSE BLDC GLUCOMTR-MCNC: 239 MG/DL — HIGH (ref 70–99)
GLUCOSE SERPL-MCNC: 128 MG/DL — HIGH (ref 70–99)
HCT VFR BLD CALC: 25.3 % — LOW (ref 34.5–45)
HGB BLD-MCNC: 8.3 G/DL — LOW (ref 11.5–15.5)
MCHC RBC-ENTMCNC: 22.9 PG — LOW (ref 27–34)
MCHC RBC-ENTMCNC: 32.8 GM/DL — SIGNIFICANT CHANGE UP (ref 32–36)
MCV RBC AUTO: 69.7 FL — LOW (ref 80–100)
NRBC # BLD: 0 /100 WBCS — SIGNIFICANT CHANGE UP (ref 0–0)
NRBC # FLD: 0 K/UL — SIGNIFICANT CHANGE UP (ref 0–0)
PLATELET # BLD AUTO: 266 K/UL — SIGNIFICANT CHANGE UP (ref 150–400)
POTASSIUM SERPL-MCNC: 4.1 MMOL/L — SIGNIFICANT CHANGE UP (ref 3.5–5.3)
POTASSIUM SERPL-SCNC: 4.1 MMOL/L — SIGNIFICANT CHANGE UP (ref 3.5–5.3)
RBC # BLD: 3.63 M/UL — LOW (ref 3.8–5.2)
RBC # FLD: 13.5 % — SIGNIFICANT CHANGE UP (ref 10.3–14.5)
SODIUM SERPL-SCNC: 134 MMOL/L — LOW (ref 135–145)
WBC # BLD: 9.93 K/UL — SIGNIFICANT CHANGE UP (ref 3.8–10.5)
WBC # FLD AUTO: 9.93 K/UL — SIGNIFICANT CHANGE UP (ref 3.8–10.5)

## 2023-01-16 PROCEDURE — 99233 SBSQ HOSP IP/OBS HIGH 50: CPT

## 2023-01-16 RX ORDER — INSULIN GLARGINE 100 [IU]/ML
5 INJECTION, SOLUTION SUBCUTANEOUS AT BEDTIME
Refills: 0 | Status: DISCONTINUED | OUTPATIENT
Start: 2023-01-16 | End: 2023-01-16

## 2023-01-16 RX ORDER — INSULIN LISPRO 100/ML
2 VIAL (ML) SUBCUTANEOUS
Refills: 0 | Status: DISCONTINUED | OUTPATIENT
Start: 2023-01-16 | End: 2023-01-16

## 2023-01-16 RX ADMIN — Medication 975 MILLIGRAM(S): at 22:33

## 2023-01-16 RX ADMIN — GABAPENTIN 100 MILLIGRAM(S): 400 CAPSULE ORAL at 22:33

## 2023-01-16 RX ADMIN — GABAPENTIN 100 MILLIGRAM(S): 400 CAPSULE ORAL at 06:00

## 2023-01-16 RX ADMIN — Medication 2 UNIT(S): at 11:30

## 2023-01-16 RX ADMIN — PANTOPRAZOLE SODIUM 40 MILLIGRAM(S): 20 TABLET, DELAYED RELEASE ORAL at 06:00

## 2023-01-16 RX ADMIN — Medication 1: at 07:52

## 2023-01-16 RX ADMIN — Medication 2: at 11:30

## 2023-01-16 RX ADMIN — TRAMADOL HYDROCHLORIDE 25 MILLIGRAM(S): 50 TABLET ORAL at 02:20

## 2023-01-16 RX ADMIN — GABAPENTIN 100 MILLIGRAM(S): 400 CAPSULE ORAL at 13:43

## 2023-01-16 RX ADMIN — Medication 975 MILLIGRAM(S): at 06:00

## 2023-01-16 RX ADMIN — SENNA PLUS 2 TABLET(S): 8.6 TABLET ORAL at 22:33

## 2023-01-16 RX ADMIN — Medication 975 MILLIGRAM(S): at 13:42

## 2023-01-16 NOTE — PROGRESS NOTE ADULT - PROBLEM SELECTOR PLAN 2
A1C 7.6%  - FS was elevated yesterday to 400s now improving  - hold home metformin while admitted   - started basal bolus regimen Lantus 5U + Admelog 2U TID qac - adjust as needed  - FS/SSI qac and hs, carb restricted diet A1C 7.6%  - hold home metformin while admitted  - FS was elevated yesterday to 400s now improving  - AM FS in acceptable range, prelunch FS elevated to 200s likely because patient on regular diet, diet changed to carb restricted diet  - FS/SSI qac and hs for now, if FS >200s elevated despite diet change start basal/bolus regimen (Lantus 5Uqhs + Admelog 2U TID qac)

## 2023-01-17 ENCOUNTER — TRANSCRIPTION ENCOUNTER (OUTPATIENT)
Age: 78
End: 2023-01-17

## 2023-01-17 VITALS — WEIGHT: 100.97 LBS

## 2023-01-17 LAB
GLUCOSE BLDC GLUCOMTR-MCNC: 131 MG/DL — HIGH (ref 70–99)
GLUCOSE BLDC GLUCOMTR-MCNC: 136 MG/DL — HIGH (ref 70–99)
GLUCOSE BLDC GLUCOMTR-MCNC: 185 MG/DL — HIGH (ref 70–99)

## 2023-01-17 PROCEDURE — 99232 SBSQ HOSP IP/OBS MODERATE 35: CPT

## 2023-01-17 RX ORDER — SENNA PLUS 8.6 MG/1
2 TABLET ORAL
Qty: 30 | Refills: 0
Start: 2023-01-17 | End: 2023-01-31

## 2023-01-17 RX ORDER — POLYETHYLENE GLYCOL 3350 17 G/17G
17 POWDER, FOR SOLUTION ORAL
Qty: 0 | Refills: 0 | DISCHARGE
Start: 2023-01-17

## 2023-01-17 RX ORDER — CYCLOBENZAPRINE HYDROCHLORIDE 10 MG/1
1 TABLET, FILM COATED ORAL
Qty: 21 | Refills: 0
Start: 2023-01-17 | End: 2023-01-23

## 2023-01-17 RX ORDER — LOSARTAN POTASSIUM 100 MG/1
1 TABLET, FILM COATED ORAL
Qty: 0 | Refills: 0 | DISCHARGE

## 2023-01-17 RX ORDER — LANOLIN ALCOHOL/MO/W.PET/CERES
1 CREAM (GRAM) TOPICAL
Qty: 0 | Refills: 0 | DISCHARGE
Start: 2023-01-17

## 2023-01-17 RX ORDER — TRAMADOL HYDROCHLORIDE 50 MG/1
0.5 TABLET ORAL
Qty: 10.5 | Refills: 0
Start: 2023-01-17 | End: 2023-01-23

## 2023-01-17 RX ORDER — PANTOPRAZOLE SODIUM 20 MG/1
1 TABLET, DELAYED RELEASE ORAL
Qty: 15 | Refills: 0
Start: 2023-01-17 | End: 2023-01-31

## 2023-01-17 RX ORDER — GABAPENTIN 400 MG/1
100 CAPSULE ORAL EVERY 8 HOURS
Refills: 0 | Status: DISCONTINUED | OUTPATIENT
Start: 2023-01-17 | End: 2023-01-17

## 2023-01-17 RX ORDER — GABAPENTIN 400 MG/1
1 CAPSULE ORAL
Qty: 21 | Refills: 0
Start: 2023-01-17 | End: 2023-01-23

## 2023-01-17 RX ADMIN — GABAPENTIN 100 MILLIGRAM(S): 400 CAPSULE ORAL at 05:41

## 2023-01-17 RX ADMIN — Medication 975 MILLIGRAM(S): at 05:41

## 2023-01-17 RX ADMIN — TRAMADOL HYDROCHLORIDE 25 MILLIGRAM(S): 50 TABLET ORAL at 17:41

## 2023-01-17 RX ADMIN — Medication 1: at 11:34

## 2023-01-17 RX ADMIN — PANTOPRAZOLE SODIUM 40 MILLIGRAM(S): 20 TABLET, DELAYED RELEASE ORAL at 05:42

## 2023-01-17 NOTE — PROGRESS NOTE ADULT - ASSESSMENT
77W with PMH significant for but not limited to HTN, HLD, T2DM on metformin, and lumbar spinal stenosis who is presenting to the hospital after L3-S1 laminectomy with L4-L5 fusion on 1/14.
77W with PMH significant for but not limited to HTN, HLD, T2DM on metformin, and lumbar spinal stenosis who is presenting to the hospital after L3-S1 laminectomy with L4-L5 fusion on 1/14.

## 2023-01-17 NOTE — DISCHARGE NOTE PROVIDER - NSDCCPTREATMENT_GEN_ALL_CORE_FT
PRINCIPAL PROCEDURE  Procedure: Fusion, spine, lumbar, posterior approach, with laminectomy  Findings and Treatment: Patient is a 78 yo female history of lumbar spinal stenosis s/p elective L3-S1 lami, L4-5 PSF with Dr. Garcia on 1/14/2023 without any intraoperative complications.  Patient is stable for discharge as per surgeon.  Patient was followed by medicine hospitalist during hospital stay. Patient is tolerating Physical Therapy: weight bearing as tolerated, gait training, and no heavy lifting or bending.  If applicable, Staples/sutures to be removed on post op day #14 in surgeon's office. Please remove dressing two days after discharge to home. The patient had no post operative complications and clinically progressed faster than anticipated. Patient was safely and appropriately discharged home.  NO ADVIL, ASPIRIN, MOTRIN, IBUPROFEN until instructed otherwise by surgeon.  Follow up with Dr. GARCIA'S office in 1week. Please remove outer dressing 48 hours after discharge from hospital.   istop: Reference #: [id number]

## 2023-01-17 NOTE — DISCHARGE NOTE PROVIDER - HOSPITAL COURSE
Patient is a 78 yo female history of lumbar spinal stenosis s/p elective L3-S1 lami, L4-5 PSF with Dr. Garcia on 1/14/2023 without any intraoperative complications.  Patient is stable for discharge as per surgeon.  Patient was followed by medicine hospitalist during hospital stay. Patient is tolerating Physical Therapy: weight bearing as tolerated, gait training, and no heavy lifting or bending.  If applicable, Staples/sutures to be removed on post op day #14 in surgeon's office. Please remove dressing two days after discharge to home. The patient had no post operative complications and clinically progressed faster than anticipated. Patient was safely and appropriately discharged home.  NO ADVIL, ASPIRIN, MOTRIN, IBUPROFEN until instructed otherwise by surgeon.  Follow up with Dr. GARCIA'S office in 1week. Please remove outer dressing 48 hours after discharge from hospital.   istop: Reference #: [id number]

## 2023-01-17 NOTE — PROGRESS NOTE ADULT - PROBLEM SELECTOR PLAN 1
s/p L3-S1 laminectomy with L4-L5 fusion on 1/14. Continue with post-op management per ortho including:  -Pain control  -Bowel regimen  -Incentive spirometer  -OOB and ambulate   -DVT ppx
s/p L3-S1 laminectomy with L4-L5 fusion on 1/14. Continue with post-op management per ortho including:  -Pain control  -Bowel regimen  -Incentive spirometer  -OOB and ambulate   -DVT ppx

## 2023-01-17 NOTE — DISCHARGE NOTE NURSING/CASE MANAGEMENT/SOCIAL WORK - NSDCPNINST_GEN_ALL_CORE
Patient was educated to keep dressing clean dry intact. Educated on proper spinal precautions. Advised to notify provider of any changes in incision.

## 2023-01-17 NOTE — PROGRESS NOTE ADULT - PROBLEM SELECTOR PLAN 4
- on ezetimibe as OP, hold while inpatient   - check non-urgent lipid panel, can be done as outpatient.
- on ezetimibe as OP, hold while inpatient   - check non-urgent lipid panel, can be done as outpatient.

## 2023-01-17 NOTE — DISCHARGE NOTE PROVIDER - NSDCCPCAREPLAN_GEN_ALL_CORE_FT
PRINCIPAL DISCHARGE DIAGNOSIS  Diagnosis: Lumbar spinal stenosis  Assessment and Plan of Treatment:       SECONDARY DISCHARGE DIAGNOSES  Diagnosis: Lumbar spinal stenosis  Assessment and Plan of Treatment:

## 2023-01-17 NOTE — DIETITIAN INITIAL EVALUATION ADULT - PERTINENT LABORATORY DATA
01-16    134<L>  |  99  |  10  ----------------------------<  128<H>  4.1   |  27  |  0.93    Ca    9.1      16 Jan 2023 05:42    CAPILLARY BLOOD GLUCOSE  POCT Blood Glucose.: 185 mg/dL (17 Jan 2023 11:15)  POCT Blood Glucose.: 131 mg/dL (17 Jan 2023 07:24)  POCT Blood Glucose.: 186 mg/dL (16 Jan 2023 22:16)  POCT Blood Glucose.: 122 mg/dL (16 Jan 2023 16:28)    A1C with Estimated Average Glucose Result: 7.6 % (01-16-23 @ 05:42)  A1C with Estimated Average Glucose Result: 8.2 % (12-19-22 @ 10:57)

## 2023-01-17 NOTE — PROGRESS NOTE ADULT - PROBLEM SELECTOR PLAN 3
BP controlled   - continue with home amlodipine 10mg and carvedilol 12.5 BID
BP controlled   - continue with home amlodipine 10mg and carvedilol 12.5 BID

## 2023-01-17 NOTE — PROGRESS NOTE ADULT - PROBLEM SELECTOR PLAN 2
A1C 7.6%  - hold home metformin while admitted  -continue carb restricted diet  FS presently acceptable <190  - FS/SSI qac and hs for now, if FS >200s elevated despite diet change start basal/bolus regimen (Lantus 5Uqhs + Admelog 2U TID qac)

## 2023-01-17 NOTE — DIETITIAN INITIAL EVALUATION ADULT - PERTINENT MEDS FT
MEDICATIONS  (STANDING):  amLODIPine   Tablet 10 milliGRAM(s) Oral daily  carvedilol 12.5 milliGRAM(s) Oral daily  gabapentin 100 milliGRAM(s) Oral every 8 hours  insulin lispro (ADMELOG) corrective regimen sliding scale   SubCutaneous three times a day before meals  insulin lispro (ADMELOG) corrective regimen sliding scale   SubCutaneous at bedtime  lactated ringers. 1000 milliLiter(s) (75 mL/Hr) IV Continuous <Continuous>  losartan 100 milliGRAM(s) Oral daily  pantoprazole    Tablet 40 milliGRAM(s) Oral before breakfast  polyethylene glycol 3350 17 Gram(s) Oral two times a day  senna 2 Tablet(s) Oral at bedtime  traMADol 25 milliGRAM(s) Oral every 8 hours    MEDICATIONS  (PRN):  cyclobenzaprine 5 milliGRAM(s) Oral three times a day PRN Muscle Spasm  melatonin 3 milliGRAM(s) Oral at bedtime PRN Sleep  ondansetron Injectable 4 milliGRAM(s) IV Push every 6 hours PRN Nausea and/or Vomiting  oxyCODONE    IR 5 milliGRAM(s) Oral every 4 hours PRN Moderate Pain (4 - 6)  oxyCODONE    IR 10 milliGRAM(s) Oral every 4 hours PRN Severe Pain (7 - 10)

## 2023-01-17 NOTE — DIETITIAN INITIAL EVALUATION ADULT - OTHER INFO
78 y/o female with PMH significant for but not limited to HTN, HLD, T2DM on metformin, and lumbar spinal stenosis who is presenting to the hospital after L3-S1 laminectomy with L4-L5 fusion on 1/14.    Patient reported fair appetite. No chewing or swallowing difficulties reported. No GI distress reported i.e. nausea, vomiting, diarrhea. No food allergies noted or reported. Patient reported she eats well balanced meals @ home, consisting of protein, lentils, rice, and vegetables. Patient reported she avoids concentrated sweets such as soda and juice.   RD reviewed carbohydrate foods with patient and provided diabetic diet education using the plate method. Written materials given for reinforcement. Patient unsure of usual body weight, estimates weight in ~100 pound range.

## 2023-01-17 NOTE — DISCHARGE NOTE NURSING/CASE MANAGEMENT/SOCIAL WORK - NSDCPEFALRISK_GEN_ALL_CORE
For information on Fall & Injury Prevention, visit: [email or web address] OR  [email or web address] OR  https://www.cdc.gov/steadi/patient.html

## 2023-01-17 NOTE — DISCHARGE NOTE PROVIDER - NSDCFUSCHEDAPPT_GEN_ALL_CORE_FT
[facility]  ORTHOSURG [address]  Scheduled Appointment: 01/25/2023

## 2023-01-17 NOTE — DISCHARGE NOTE PROVIDER - NSDCMRMEDTOKEN_GEN_ALL_CORE_FT
amLODIPine 10 mg oral tablet: 1 tab(s) orally once a day AM  carvedilol 12.5 mg oral tablet: 1 tab(s) orally once a day AM  cyclobenzaprine 5 mg oral tablet: 1 tab(s) orally 3 times a day, As needed, Muscle Spasm MDD:3  ezetimibe 10 mg oral tablet: 1 tab(s) orally once a day AM  gabapentin 100 mg oral capsule: 1 cap(s) orally every 8 hours MDD:3  melatonin 3 mg oral tablet: 1 tab(s) orally once a day (at bedtime), As needed, Sleep  metFORMIN 500 mg oral tablet: 1 tab(s) orally 3 times a day  pantoprazole 40 mg oral delayed release tablet: 1 tab(s) orally once a day (before a meal)  polyethylene glycol 3350 oral powder for reconstitution: 17 gram(s) orally 2 times a day  senna leaf extract oral tablet: 2 tab(s) orally once a day (at bedtime)  traMADol 50 mg oral tablet: 0.5 tab(s) orally every 8 hours MDD:3

## 2023-01-17 NOTE — DISCHARGE NOTE PROVIDER - CARE PROVIDER_API CALL
[facility])  Orthopaedic Surgery  [address], [address]  [place] [address]  Phone: [phone]  Fax: [phone]  Follow Up Time: 2 weeks

## 2023-01-17 NOTE — DISCHARGE NOTE NURSING/CASE MANAGEMENT/SOCIAL WORK - PATIENT PORTAL LINK FT
You can access the FollowMyHealth Patient Portal offered by [facility] by registering at the following website: [email or web address]. By joining [facility]’s FollowMyHealth portal, you will also be able to view your health information using other applications (apps) compatible with our system.

## 2023-01-17 NOTE — PROGRESS NOTE ADULT - SUBJECTIVE AND OBJECTIVE BOX
ORTHO PROGRESS NOTE     No acute overnight events. Pt resting comfortably without complaint. Pain controlled  Walking with Physical Therapy     Vital Signs Last 24 Hrs  T(C): 36.6 (15 Edin 2023 01:51), Max: 37.5 (14 Jan 2023 12:35)  T(F): 97.8 (15 Edin 2023 01:51), Max: 99.5 (14 Jan 2023 12:35)  HR: 64 (15 Edin 2023 01:51) (56 - 80)  BP: 132/64 (15 Edin 2023 01:51) (99/20 - 153/65)  BP(mean): 68 (14 Jan 2023 15:30) (26 - 91)  RR: 18 (15 Edin 2023 01:51) (7 - 18)  SpO2: 100% (15 Edin 2023 01:51) (94% - 100%)    Parameters below as of 15 Edin 2023 01:51  Patient On (Oxygen Delivery Method): room air        Back: Dressing/ Incision Clean/Dry/Intact,  HV intact with SS output  Motor:                   C5                C6              C7               C8           T1   R            5/5                5/5            5/5             5/5          5/5  L             5/5               5/5             5/5             5/5          5/5                L2             L3             L4               L5            S1  R         5/5           5/5          5/5             5/5           5/5  L          5/5          5/5           5/5             5/5           5/5    Sensory:            C5         C6         C7      C8       T1        (0=absent, 1=impaired, 2=normal, NT=not testable)  R         2            2           2        2         2  L          2            2           2        2         2               L2          L3         L4      L5       S1         (0=absent, 1=impaired, 2=normal, NT=not testable)  R         2            2            2        2        2  L          2            2           2        2         2        A/P: 77yFemale s/p L3-S1 lami, L4-5 fusion           - WBAT           -PT/OOB           -DVT ppx- venodynes           - Pain control           -Incentive spirometer.         
Polly Rodriguez MD  [facility] Division of Hospital Medicine  Pager [phone] (M-F 8AM-5PM)  Other Times: [phone]    Patient is a 77y old  Female who presents with a chief complaint of     SUBJECTIVE / OVERNIGHT EVENTS: no acute events overnight    MEDICATIONS  (STANDING):  acetaminophen     Tablet .. 975 milliGRAM(s) Oral every 8 hours  amLODIPine   Tablet 10 milliGRAM(s) Oral daily  carvedilol 12.5 milliGRAM(s) Oral daily  gabapentin Solution 100 milliGRAM(s) Oral three times a day  insulin glargine Injectable (LANTUS) 5 Unit(s) SubCutaneous at bedtime  insulin lispro (ADMELOG) corrective regimen sliding scale   SubCutaneous three times a day before meals  insulin lispro (ADMELOG) corrective regimen sliding scale   SubCutaneous at bedtime  insulin lispro Injectable (ADMELOG) 2 Unit(s) SubCutaneous three times a day before meals  lactated ringers. 1000 milliLiter(s) (75 mL/Hr) IV Continuous <Continuous>  losartan 100 milliGRAM(s) Oral daily  pantoprazole    Tablet 40 milliGRAM(s) Oral before breakfast  polyethylene glycol 3350 17 Gram(s) Oral two times a day  senna 2 Tablet(s) Oral at bedtime  traMADol 25 milliGRAM(s) Oral every 8 hours    MEDICATIONS  (PRN):  cyclobenzaprine 5 milliGRAM(s) Oral three times a day PRN Muscle Spasm  melatonin 3 milliGRAM(s) Oral at bedtime PRN Sleep  ondansetron Injectable 4 milliGRAM(s) IV Push every 6 hours PRN Nausea and/or Vomiting  oxyCODONE    IR 5 milliGRAM(s) Oral every 4 hours PRN Moderate Pain (4 - 6)  oxyCODONE    IR 10 milliGRAM(s) Oral every 4 hours PRN Severe Pain (7 - 10)      PHYSICAL EXAM:  Vital Signs Last 24 Hrs  T(C): 37.2 (16 Jan 2023 13:51), Max: 38.8 (15 Edin 2023 18:28)  T(F): 99 (16 Jan 2023 13:51), Max: 101.9 (15 Edin 2023 18:28)  HR: 88 (16 Jan 2023 13:51) (66 - 90)  BP: 103/82 (16 Jan 2023 13:51) (103/82 - 132/55)  BP(mean): --  RR: 18 (16 Jan 2023 13:51) (16 - 18)  SpO2: 100% (16 Jan 2023 13:51) (95% - 100%)    Parameters below as of 16 Jan 2023 13:51  Patient On (Oxygen Delivery Method): room air        CONSTITUTIONAL: NAD, well-developed, well-groomed  RESPIRATORY: Normal respiratory effort; lungs are clear to auscultation bilaterally  CARDIOVASCULAR: Regular rate and rhythm, normal S1 and S2, no murmur/rub/gallop; No lower extremity edema  GASTROINTESTINAL: Nontender to palpation, normoactive bowel sounds, no rebound/guarding; No hepatosplenomegaly  MUSCULOSKELETAL:  no clubbing or cyanosis of digits; no joint swelling or tenderness to palpation  NEUROLOGY: non-focal; no gross sensory deficits   PSYCH: A+O to person, place, and time; affect appropriate  SKIN: No rashes; warm     LABS:                        8.3    9.93  )-----------( 266      ( 16 Jan 2023 05:42 )             25.3     01-16    134<L>  |  99  |  10  ----------------------------<  128<H>  4.1   |  27  |  0.93    Ca    9.1      16 Jan 2023 05:42                  RADIOLOGY & ADDITIONAL TESTS:  Results Reviewed:   Imaging Personally Reviewed:  Electrocardiogram Personally Reviewed:    COORDINATION OF CARE:  Care Discussed with Consultants/Other Providers [Y/N]:  Prior or Outpatient Records Reviewed [Y/N]:  
Pt seen/examined. Doing well. Pain controlled. Denies lower extremity numbness or tingling. No acute overnight complaints or events.    T(C): 36.8 (01-17-23 @ 05:38), Max: 37.2 (01-16-23 @ 13:51)  HR: 78 (01-17-23 @ 05:38) (78 - 88)  BP: 148/53 (01-17-23 @ 05:38) (103/82 - 148/53)  RR: 18 (01-17-23 @ 05:38) (16 - 18)  SpO2: 100% (01-17-23 @ 05:38) (97% - 100%)  Wt(kg): --    Gen: awake, alert, no acute distress  Resp: no increased work of breathing  Spine PE:  Dressing clean dry intact  HV drain serosang out put: 17/17cc    Motor:                   C5                C6              C7               C8           T1   R            5/5                5/5            5/5             5/5          5/5  L             5/5               5/5             5/5             5/5          5/5                L2             L3             L4               L5            S1  R         5/5           5/5          5/5             5/5           5/5  L          5/5          5/5           5/5             5/5           5/5    Sensory:            C5         C6         C7      C8       T1        (0=absent, 1=impaired, 2=normal, NT=not testable)  R         2            2           2        2         2  L          2            2           2        2         2               L2          L3         L4      L5       S1         (0=absent, 1=impaired, 2=normal, NT=not testable)  R         2            2            2        2        2  L          2            2           2        2         2     A/P: 76yo Female s/p L3-S1 lami and L4-L5 fusion    - Pain control  - mechanical/DVT ppx  - OOB/PT  - WBAT   - IS  - FU  output   - Dispo planning: Home
Pt seen/examined. Doing well. Pain controlled. Denies numbness or tingling in bilateral lower extremities. No acute overnight complaints or events.    T(C): 36.9 (01-16-23 @ 05:42), Max: 38.8 (01-15-23 @ 18:28)  HR: 73 (01-16-23 @ 05:42) (65 - 90)  BP: 123/50 (01-16-23 @ 05:42) (116/45 - 148/96)  RR: 18 (01-16-23 @ 05:42) (16 - 18)  SpO2: 96% (01-16-23 @ 05:42) (95% - 99%)  Wt(kg): --    Back: Dressing/ Incision Clean/Dry/Intact,    HV intact with SS output  Motor:                   C5                C6              C7               C8           T1   R            5/5                5/5            5/5             5/5          5/5  L             5/5               5/5             5/5             5/5          5/5                L2             L3             L4               L5            S1  R         5/5           5/5          5/5             5/5           5/5  L          5/5          5/5           5/5             5/5           5/5    Sensory:            C5         C6         C7      C8       T1        (0=absent, 1=impaired, 2=normal, NT=not testable)  R         2            2           2        2         2  L          2            2           2        2         2               L2          L3         L4      L5       S1         (0=absent, 1=impaired, 2=normal, NT=not testable)  R         2            2            2        2        2  L          2            2           2        2         2                        8.7    9.33  )-----------( 297      ( 15 Edin 2023 05:18 )             26.3                         9.1    10.59 )-----------( 297      ( 14 Jan 2023 13:34 )             27.9       01-15    136  |  100  |  13  ----------------------------<  158<H>  4.2   |  26  |  1.04          A/P: 76yo Female s/p L3-S1 lami and L4-L5 fusion    - Pain control  - mechanical/DVT ppx  - OOB/PT  - WBAT   - IS  - FU  output   - Dispo planning: Home
[facility] Division of Hospital Medicine  Kiana Wheatley DO  Pager (ALTA, 7W-5P): [phone]      Patient is a 77y old  Female who presents with a chief complaint of L3-S1 lami, L4-5 PSF (17 Jan 2023 10:40)      SUBJECTIVE / OVERNIGHT EVENTS: No acute events overnight. Has some back pain, reports skin burning on back. Denies chest pain, SOB    MEDICATIONS  (STANDING):  amLODIPine   Tablet 10 milliGRAM(s) Oral daily  carvedilol 12.5 milliGRAM(s) Oral daily  gabapentin 100 milliGRAM(s) Oral every 8 hours  insulin lispro (ADMELOG) corrective regimen sliding scale   SubCutaneous three times a day before meals  insulin lispro (ADMELOG) corrective regimen sliding scale   SubCutaneous at bedtime  lactated ringers. 1000 milliLiter(s) (75 mL/Hr) IV Continuous <Continuous>  losartan 100 milliGRAM(s) Oral daily  pantoprazole    Tablet 40 milliGRAM(s) Oral before breakfast  polyethylene glycol 3350 17 Gram(s) Oral two times a day  senna 2 Tablet(s) Oral at bedtime  traMADol 25 milliGRAM(s) Oral every 8 hours    MEDICATIONS  (PRN):  cyclobenzaprine 5 milliGRAM(s) Oral three times a day PRN Muscle Spasm  melatonin 3 milliGRAM(s) Oral at bedtime PRN Sleep  ondansetron Injectable 4 milliGRAM(s) IV Push every 6 hours PRN Nausea and/or Vomiting  oxyCODONE    IR 5 milliGRAM(s) Oral every 4 hours PRN Moderate Pain (4 - 6)  oxyCODONE    IR 10 milliGRAM(s) Oral every 4 hours PRN Severe Pain (7 - 10)      CAPILLARY BLOOD GLUCOSE      POCT Blood Glucose.: 185 mg/dL (17 Jan 2023 11:15)  POCT Blood Glucose.: 131 mg/dL (17 Jan 2023 07:24)  POCT Blood Glucose.: 186 mg/dL (16 Jan 2023 22:16)  POCT Blood Glucose.: 122 mg/dL (16 Jan 2023 16:28)    I&O's Summary    16 Jan 2023 07:01  -  17 Jan 2023 07:00  --------------------------------------------------------  IN: 0 mL / OUT: 1417 mL / NET: -1417 mL    17 Jan 2023 07:01  -  17 Jan 2023 11:52  --------------------------------------------------------  IN: 0 mL / OUT: 200 mL / NET: -200 mL        PHYSICAL EXAM:  Vital Signs Last 24 Hrs  T(C): 36.8 (17 Jan 2023 09:22), Max: 37.2 (16 Jan 2023 13:51)  T(F): 98.3 (17 Jan 2023 09:22), Max: 99 (16 Jan 2023 13:51)  HR: 85 (17 Jan 2023 09:22) (78 - 88)  BP: 129/43 (17 Jan 2023 09:22) (103/82 - 148/53)  BP(mean): --  RR: 18 (17 Jan 2023 09:22) (16 - 18)  SpO2: 100% (17 Jan 2023 09:22) (97% - 100%)    Parameters below as of 17 Jan 2023 09:22  Patient On (Oxygen Delivery Method): room air        CONSTITUTIONAL: NAD, on room air  HEENT: sclera clear, MMM, eyes tracking  RESPIRATORY: Normal respiratory effort; lungs are clear to auscultation bilaterally  CARDIOVASCULAR: S1/S2, RRR, no murmurs appreciated; No lower extremity edema  ABDOMEN: soft, Nontender, nondistended, normoactive bowel sounds, no rebound/guarding  PSYCH: calm, cooperative  NEUROLOGY: awake, alert, no gross deficits, moving all extremities  SKIN: No rashes; no palpable lesions    LABS:                        8.3    9.93  )-----------( 266      ( 16 Jan 2023 05:42 )             25.3     01-16    134<L>  |  99  |  10  ----------------------------<  128<H>  4.1   |  27  |  0.93    Ca    9.1      16 Jan 2023 05:42                  RADIOLOGY & ADDITIONAL TESTS:  Results Reviewed:   Imaging Personally Reviewed:  Electrocardiogram Personally Reviewed:    COORDINATION OF CARE:  Care Discussed with Consultants/Other Providers [Y/N]:  Prior or Outpatient Records Reviewed [Y/N]:

## 2023-01-19 LAB — SURGICAL PATHOLOGY STUDY: SIGNIFICANT CHANGE UP

## 2023-01-27 ENCOUNTER — APPOINTMENT (OUTPATIENT)
Dept: ORTHOPEDIC SURGERY | Facility: CLINIC | Age: 78
End: 2023-01-27
Payer: MEDICARE

## 2023-01-27 PROCEDURE — 99024 POSTOP FOLLOW-UP VISIT: CPT

## 2023-01-27 PROCEDURE — 72100 X-RAY EXAM L-S SPINE 2/3 VWS: CPT

## 2023-01-27 NOTE — DISCUSSION/SUMMARY
[de-identified] : Overall, she is recovering well.  Restrictions were reviewed.  She will follow-up in 1 week's time for suture removal.

## 2023-01-27 NOTE — PHYSICAL EXAM
[Antalgic] : not antalgic [de-identified] : Her incision is healing well.  Stable neurologic exam. [de-identified] : AP lateral lumbar x-rays reveals instrumented L4-5 fusion.

## 2023-01-27 NOTE — HISTORY OF PRESENT ILLNESS
[de-identified] : Ms. SARAN CAMARA  is a 77 year old female who presents s/p L3-S1 laminectomy and L4-5 fusion on 1/14/23.  Overall, she is doing well.  She has right lumbar surgical pain.  She is taking Tylenol for symptom control.

## 2023-02-03 ENCOUNTER — APPOINTMENT (OUTPATIENT)
Dept: ORTHOPEDIC SURGERY | Facility: CLINIC | Age: 78
End: 2023-02-03
Payer: MEDICARE

## 2023-02-03 ENCOUNTER — NON-APPOINTMENT (OUTPATIENT)
Age: 78
End: 2023-02-03

## 2023-02-03 DIAGNOSIS — M48.07 SPINAL STENOSIS, LUMBOSACRAL REGION: ICD-10-CM

## 2023-02-03 DIAGNOSIS — M54.16 RADICULOPATHY, LUMBAR REGION: ICD-10-CM

## 2023-02-03 PROCEDURE — 99024 POSTOP FOLLOW-UP VISIT: CPT

## 2023-02-03 NOTE — HISTORY OF PRESENT ILLNESS
[de-identified] : Ms. SARAN CAMARA  is a 77 year old female who presents s/p L3-S1 laminectomy and L4-5 fusion on 1/14/23.  Overall, she is doing well.  She has right lumbar surgical pain.  She is taking Tylenol for symptom control.    She is here for suture removal.

## 2023-02-03 NOTE — DISCUSSION/SUMMARY
[de-identified] : She continues to recover well.  Preoperative radiculopathy significantly improved.  She will follow-up in 3 to 4 weeks time for reevaluation or sooner with any changes or worsening of her symptoms.  Restrictions were reviewed.

## 2023-02-03 NOTE — PHYSICAL EXAM
[Antalgic] : not antalgic [de-identified] : Her incision is healing well.  Stable neurologic exam. [de-identified] : AP lateral lumbar x-rays reveals instrumented L4-5 fusion.

## 2023-03-17 ENCOUNTER — APPOINTMENT (OUTPATIENT)
Dept: ORTHOPEDIC SURGERY | Facility: CLINIC | Age: 78
End: 2023-03-17
Payer: MEDICARE

## 2023-03-17 PROCEDURE — 99024 POSTOP FOLLOW-UP VISIT: CPT

## 2023-03-17 PROCEDURE — 72100 X-RAY EXAM L-S SPINE 2/3 VWS: CPT

## 2023-03-17 NOTE — PHYSICAL EXAM
[Antalgic] : not antalgic [de-identified] : Her incision is healing well.  Stable neurologic exam. [de-identified] : AP lateral lumbar x-rays reveals instrumented L4-5 fusion.

## 2023-03-17 NOTE — HISTORY OF PRESENT ILLNESS
[de-identified] : Ms. SARAN CAMARA  is a 77 year old female who presents s/p L3-S1 laminectomy and L4-5 fusion on 1/14/23.  Overall, she is doing well.  She is complaining of her incision being itchy.

## 2023-03-17 NOTE — DISCUSSION/SUMMARY
[de-identified] : Overall, she is recovering well.  Restrictions were reviewed.  Follow-up in 6 weeks.

## 2023-06-02 ENCOUNTER — APPOINTMENT (OUTPATIENT)
Dept: ORTHOPEDIC SURGERY | Facility: CLINIC | Age: 78
End: 2023-06-02
Payer: MEDICARE

## 2023-06-02 VITALS
OXYGEN SATURATION: 97 % | TEMPERATURE: 96 F | SYSTOLIC BLOOD PRESSURE: 154 MMHG | DIASTOLIC BLOOD PRESSURE: 70 MMHG | BODY MASS INDEX: 19.76 KG/M2 | HEIGHT: 59 IN | HEART RATE: 65 BPM | WEIGHT: 98 LBS

## 2023-06-02 PROCEDURE — 99214 OFFICE O/P EST MOD 30 MIN: CPT

## 2023-06-12 NOTE — PHYSICAL EXAM
[Antalgic] : not antalgic [de-identified] : Her incision is healing well.  Stable neurologic exam. [de-identified] : AP lateral lumbar x-rays reveals instrumented L4-5 fusion.

## 2023-06-12 NOTE — DISCUSSION/SUMMARY
[de-identified] : No focal deficits on her examination.  I recommended a lumbar x-ray for further evaluation.  She will follow-up with me after her x-ray.

## 2023-06-12 NOTE — HISTORY OF PRESENT ILLNESS
[de-identified] : Ms. SARAN CAMARA  is a 77 year old female who presents s/p L3-S1 laminectomy and L4-5 fusion on 1/14/23.   She has some low back pain. The past few days she has had right buttock pain.  She takes Tylenol for symptom control.

## 2023-08-04 ENCOUNTER — APPOINTMENT (OUTPATIENT)
Dept: ORTHOPEDIC SURGERY | Facility: CLINIC | Age: 78
End: 2023-08-04
Payer: MEDICARE

## 2023-08-04 VITALS
WEIGHT: 106 LBS | DIASTOLIC BLOOD PRESSURE: 70 MMHG | HEIGHT: 59 IN | BODY MASS INDEX: 21.37 KG/M2 | SYSTOLIC BLOOD PRESSURE: 135 MMHG | HEART RATE: 56 BPM

## 2023-08-04 PROCEDURE — 99214 OFFICE O/P EST MOD 30 MIN: CPT

## 2023-08-25 NOTE — DISCUSSION/SUMMARY
[de-identified] : We discussed further treatment options.  She will try some additional physical therapy.  Follow-up in 2 to 3 months time or sooner with any changes or worsening of her symptoms.

## 2023-08-25 NOTE — HISTORY OF PRESENT ILLNESS
[de-identified] : Ms. SARAN CAMARA  is a 77 year old female who presents s/p L3-S1 laminectomy and L4-5 fusion on 1/14/23.   She has some low back pain. Denies any leg pain.

## 2023-08-25 NOTE — PHYSICAL EXAM
[Antalgic] : not antalgic [de-identified] : Her incision is healing well.  Stable neurologic exam. [de-identified] : AP lateral lumbar x-rays reveals instrumented L4-5 fusion.

## 2024-03-22 ENCOUNTER — APPOINTMENT (OUTPATIENT)
Dept: ORTHOPEDIC SURGERY | Facility: CLINIC | Age: 79
End: 2024-03-22
Payer: MEDICARE

## 2024-03-22 VITALS — DIASTOLIC BLOOD PRESSURE: 80 MMHG | OXYGEN SATURATION: 98 % | SYSTOLIC BLOOD PRESSURE: 176 MMHG | HEART RATE: 75 BPM

## 2024-03-22 DIAGNOSIS — M43.16 SPONDYLOLISTHESIS, LUMBAR REGION: ICD-10-CM

## 2024-03-22 PROCEDURE — 72100 X-RAY EXAM L-S SPINE 2/3 VWS: CPT

## 2024-03-22 PROCEDURE — 99214 OFFICE O/P EST MOD 30 MIN: CPT

## 2024-03-22 NOTE — PHYSICAL EXAM
[Antalgic] : not antalgic [de-identified] : AP lateral lumbar x-rays reveals instrumented L4-5 fusion.  She may have some slight haloing of her L4 screws [de-identified] : Incision is well-healed.  Stable neurologic exam.

## 2024-03-22 NOTE — HISTORY OF PRESENT ILLNESS
[de-identified] : Ms. SARAN CAMARA  is a 77 year old female who presents s/p L3-S1 laminectomy and L4-5 fusion on 1/14/23.     Overall, she is doing great.   She will complain of right lumbar pain at times.

## 2024-03-22 NOTE — DISCUSSION/SUMMARY
[de-identified] : We reviewed her imaging.  We discussed further treatment options.  Overall she is doing well.  Some occasional lumbar symptoms when getting out of a chair.  She no longer has any radiculopathy.  This point she wished to continue with conservative measures.  Follow-up in 6 months time.

## 2025-08-04 NOTE — ASU PREOP CHECKLIST - VIA
Suture Removal    Date/Time: 8/4/2025 2:30 PM  Location procedure was performed: [facility]    Performed by: Patel Foster PA-C  Authorized by: Patel Foster PA-C  Body area: head/neck  Location details: scalp  Wound Appearance: clean and well healed  Sutures Removed: 0  Staples Removed: 7  Patient tolerance: Patient tolerated the procedure well with no immediate complications         stretcher

## (undated) DEVICE — FOLEY TRAY 14FR 5CC LF UMETER CLOSED

## (undated) DEVICE — GLV 8 PROTEXIS (WHITE)

## (undated) DEVICE — TUBING ATS SUCTION LINE

## (undated) DEVICE — Device

## (undated) DEVICE — POSITIONER JACKSON TABLE PRONEVIEW CUSHION, CHEST, HIP, THIGH PADS

## (undated) DEVICE — MIDAS REX LEGEND BALL FLUTED SM BORE 4.0MM X 10CM

## (undated) DEVICE — CLIPPER BLADE NEURO (BLUE)

## (undated) DEVICE — ELCTR BOVIE PENCIL BLADE 10FT

## (undated) DEVICE — VENODYNE/SCD SLEEVE CALF MEDIUM

## (undated) DEVICE — SOL IRR POUR H2O 500ML

## (undated) DEVICE — SUT VICRYL 0 27" OS-6 UNDYED

## (undated) DEVICE — ELCTR AQUAMANTYS BIPOLAR SEALER 6.0

## (undated) DEVICE — SOL IRR POUR NS 0.9% 1000ML

## (undated) DEVICE — ELCTR GROUNDING PAD ADULT COVIDIEN

## (undated) DEVICE — PACK SCOLIOSIS LIJ

## (undated) DEVICE — DRAPE SURGICAL #1010

## (undated) DEVICE — DRSG CURITY GAUZE SPONGE 4 X 4" 12-PLY

## (undated) DEVICE — DRAPE TOWEL BLUE 17" X 24"

## (undated) DEVICE — ELCTR PEDICLE SCREW PROBE 3MM BALL 1.8MM X 100MM

## (undated) DEVICE — DRAPE C ARM C-ARMOUR

## (undated) DEVICE — STRYKER BONE MILL BLADE FINE 3.2MM

## (undated) DEVICE — PREP DURAPREP 26CC

## (undated) DEVICE — MIDAS REX LEGEND BALL FLUTED LG BORE 6.0MM X 14CM

## (undated) DEVICE — ELCTR BOVIE TIP BLADE INSULATED 2.75" EDGE

## (undated) DEVICE — SUT NYLON 2-0 18" FS

## (undated) DEVICE — SUT MONOCRYL 3-0 27" PS-2 UNDYED

## (undated) DEVICE — SUT VICRYL 2-0 27" FS-1 UNDYED

## (undated) DEVICE — DRAPE COVER SNAP 36X30"

## (undated) DEVICE — WOUND IRR SURGIPHOR

## (undated) DEVICE — DRSG BIOPATCH DISK W CHG 1" W 4.0MM HOLE

## (undated) DEVICE — NDL HYPO SAFE 21G X 1.5" (GREEN)

## (undated) DEVICE — BIPOLAR FORCEP STRYKER STANDARD 8" X 1MM (YELLOW)

## (undated) DEVICE — GLV 7.5 PROTEXIS (BLUE)

## (undated) DEVICE — SUT SILK 2-0 18" FS

## (undated) DEVICE — DRAIN JACKSON PRATT 3 SPRING RESERVOIR W 10FR PVC DRAIN

## (undated) DEVICE — SUT VICRYL 1 36" CT-1 UNDYED

## (undated) DEVICE — GLV 7.5 PROTEXIS (WHITE)

## (undated) DEVICE — SUCTION YANKAUER NO CONTROL VENT

## (undated) DEVICE — SPONGE DISSECTOR PEANUT